# Patient Record
Sex: FEMALE | Race: BLACK OR AFRICAN AMERICAN | Employment: FULL TIME | URBAN - METROPOLITAN AREA
[De-identification: names, ages, dates, MRNs, and addresses within clinical notes are randomized per-mention and may not be internally consistent; named-entity substitution may affect disease eponyms.]

---
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
## 2021-02-24 ENCOUNTER — HOSPITAL ENCOUNTER (EMERGENCY)
Age: 21
Discharge: HOME | End: 2021-02-24
Attending: EMERGENCY MEDICINE
Payer: COMMERCIAL

## 2021-02-24 VITALS
OXYGEN SATURATION: 100 % | TEMPERATURE: 97.7 F | HEART RATE: 108 BPM | WEIGHT: 268 LBS | RESPIRATION RATE: 16 BRPM | DIASTOLIC BLOOD PRESSURE: 93 MMHG | SYSTOLIC BLOOD PRESSURE: 139 MMHG | BODY MASS INDEX: 52.61 KG/M2 | HEIGHT: 60 IN

## 2021-02-24 DIAGNOSIS — J02.9 VIRAL PHARYNGITIS: Primary | ICD-10-CM

## 2021-02-24 LAB — STREP GRP A PCR: NEGATIVE

## 2021-02-24 PROCEDURE — 99283 EMERGENCY DEPT VISIT LOW MDM: CPT

## 2021-02-24 PROCEDURE — 6370000000 HC RX 637 (ALT 250 FOR IP): Performed by: EMERGENCY MEDICINE

## 2021-02-24 PROCEDURE — 87880 STREP A ASSAY W/OPTIC: CPT

## 2021-02-24 RX ORDER — LORATADINE AND PSEUDOEPHEDRINE SULFATE 5; 120 MG/1; MG/1
1 TABLET, EXTENDED RELEASE ORAL 2 TIMES DAILY
Qty: 20 TABLET | Refills: 0 | Status: SHIPPED | OUTPATIENT
Start: 2021-02-24

## 2021-02-24 RX ORDER — IBUPROFEN 600 MG/1
600 TABLET ORAL ONCE
Status: COMPLETED | OUTPATIENT
Start: 2021-02-24 | End: 2021-02-24

## 2021-02-24 RX ORDER — IBUPROFEN 800 MG/1
800 TABLET ORAL EVERY 8 HOURS PRN
Qty: 20 TABLET | Refills: 0 | Status: SHIPPED | OUTPATIENT
Start: 2021-02-24

## 2021-02-24 RX ADMIN — IBUPROFEN 600 MG: 600 TABLET, FILM COATED ORAL at 22:44

## 2021-02-24 ASSESSMENT — PAIN DESCRIPTION - DESCRIPTORS: DESCRIPTORS: PRESSURE

## 2021-02-24 ASSESSMENT — PAIN SCALES - GENERAL: PAINLEVEL_OUTOF10: 6

## 2021-02-24 ASSESSMENT — PAIN DESCRIPTION - PROGRESSION: CLINICAL_PROGRESSION: GRADUALLY WORSENING

## 2021-02-25 NOTE — ED NOTES
Ok to d/c with  per Dr. Don Dixon. Patient verbalized understanding of d/c, f/u & Rx instructions. Patient ambulatory to exit.       Ashlyn Floyd RN  02/24/21 [id number]

## 2021-02-25 NOTE — ED PROVIDER NOTES
HPI:  2/24/21, Time: 10:36 PM JACK Hernandez is a 24 y.o. female presenting to the ED for sore throat and sinus congestion, beginning 1 day ago. The complaint has been persistent, mild in severity, and worsened by swallowing. Patient had a reported fever/chills, no chest pain, no shortness of breath, no change in bowel bladder patterns, no headache no neck stiffness reported. No coughing, no colored sputum,, no nausea/vomiting/diarrhea reported. No other complaints. Patient rates her sore throat pain at 6/10 severity. No relieving factors. Review of Systems:   A complete review of systems was performed and pertinent positives and negatives are stated within HPI, all other systems reviewed and are negative.    --------------------------------------------- PAST HISTORY ---------------------------------------------  Past Medical History:  has a past medical history of Ankle fracture, Eczema, and Environmental and seasonal allergies. Past Surgical History:  has a past surgical history that includes Ankle surgery. Social History:  reports that she is a non-smoker but has been exposed to tobacco smoke. She has never used smokeless tobacco. She reports that she does not drink alcohol or use drugs. Family History: family history includes Diabetes in her paternal grandmother; Heart Attack (age of onset: 27) in an other family member; Heart Attack (age of onset: 28) in her maternal grandmother; High Blood Pressure in her maternal grandfather, maternal grandmother, and paternal grandmother. The patients home medications have been reviewed.     Allergies: Seasonal    -------------------------------------------------- RESULTS -------------------------------------------------  All laboratory and radiology results have been personally reviewed by myself   LABS:  Results for orders placed or performed during the hospital encounter of 02/24/21   Strep Screen Group A Throat    Specimen: Throat   Result Value Ref Range    Strep Grp A PCR Negative Negative       RADIOLOGY:  Interpreted by Radiologist.  No orders to display       ------------------------- NURSING NOTES AND VITALS REVIEWED ---------------------------  The nursing notes within the ED encounter and vital signs as below have been reviewed. BP (!) 139/93   Pulse (!) 108   Temp 97.7 °F (36.5 °C) (Infrared)   Resp 16   Ht 5' (1.524 m)   Wt (!) 268 lb (121.6 kg)   SpO2 100%   BMI 52.34 kg/m²   Oxygen Saturation Interpretation: Normal      ---------------------------------------------------PHYSICAL EXAM--------------------------------------      Constitutional/General: Alert and oriented x3, well appearing, non toxic in NAD  Head: Normocephalic and atraumatic  Eyes: PERRL, EOMI  Mouth: Oropharynx clear, handling secretions, no trismus; no uvular shift, no pharyngeal edema  Neck: Supple, full ROM, no JVD. Trachea midline  Pulmonary: Lungs clear to auscultation bilaterally, no wheezes, rales, or rhonchi. Not in respiratory distress  Cardiovascular:  Regular rate and rhythm, no murmurs, gallops, or rubs. 2+ distal pulses  Abdomen: Soft, non tender, non distended, otherwise normal   Extremities: Moves all extremities x 4. Warm and well perfused  Skin: warm and dry without rash  Neurologic: GCS 15, cranial nerves gross intact no focal deficits  Psych: Normal Affect      ------------------------------ ED COURSE/MEDICAL DECISION MAKING----------------------  Medications   ibuprofen (ADVIL;MOTRIN) tablet 600 mg (600 mg Oral Given 2/24/21 2244)         ED COURSE:       Medical Decision Making:   Patient had a negative rapid strep screen and she is advised her symptoms are likely viral.  No antibiotics are felt to be warranted. Did provide her a prescription for Claritin-D as needed for congestion relief. Did advise patient to follow-up with [facility] internal medicine center. Counseling:   The emergency provider has spoken with the patient and discussed todays results, in addition to providing specific details for the plan of care and counseling regarding the diagnosis and prognosis. Questions are answered at this time and they are agreeable with the plan.      --------------------------------- IMPRESSION AND DISPOSITION ---------------------------------    IMPRESSION  1. Viral pharyngitis        DISPOSITION  Disposition: Discharge to home  Patient condition is stable      NOTE: This report was transcribed using voice recognition software.  Every effort was made to ensure accuracy; however, inadvertent computerized transcription errors may be present        Lance Courtney MD  02/25/21 [id number]